# Patient Record
Sex: FEMALE | Race: BLACK OR AFRICAN AMERICAN | Employment: FULL TIME | URBAN - METROPOLITAN AREA
[De-identification: names, ages, dates, MRNs, and addresses within clinical notes are randomized per-mention and may not be internally consistent; named-entity substitution may affect disease eponyms.]

---

## 2020-01-02 ENCOUNTER — TELEPHONE (OUTPATIENT)
Dept: PLASTIC SURGERY | Facility: CLINIC | Age: 30
End: 2020-01-02

## 2020-01-02 DIAGNOSIS — F64.0 GENDER DYSPHORIA IN ADOLESCENT AND ADULT: Primary | ICD-10-CM

## 2020-01-02 NOTE — TELEPHONE ENCOUNTER
[facility]:  Care Coordination Note     SITUATION   Claudia Meade (he/him) is a 29 year old who is receiving support for:  Clinic Care Coordination - Initial (Scheduled consult with Dr. Salamanca)  .    BACKGROUND     Pt is from [place]    Pt wanted to have surgery with [facility].    Writer forgot to ask if pt was a smoker and if they were diabetic.    Pt is on hormones    Pt reported that he does have a LOS and will work on mammogram.    ASSESSMENT     Surgery              [facility] Assessment  [facility] [place] [place]  Patient has a therapist: Yes  Letter of support #1: Requested  Surgery being considered: Yes  Mastectomy: Yes  Mammogram completed: Yes          PLAN            Follow-up plan:      Pt to attend appointment with Dr. Salamanca 4/7/20 with Dr. Salamanca.       Tara Rg

## 2020-01-03 NOTE — TELEPHONE ENCOUNTER
FUTURE VISIT INFORMATION      FUTURE VISIT INFORMATION:    Date: 4/7/20    Time: 1:00pm    [place] [facility] [place]  REFERRAL INFORMATION:    Referring provider:  Self    Referring providers clinic:  N/A    Reason for visit/diagnosis  Top consult    RECORDS REQUESTED FROM:       No recs to collect

## 2020-03-31 ENCOUNTER — PATIENT OUTREACH (OUTPATIENT)
Dept: PLASTIC SURGERY | Facility: CLINIC | Age: 30
End: 2020-03-31

## 2020-03-31 NOTE — PROGRESS NOTES
Called pt, LVM, to discuss change in consultation process & confirm address to send consultation packet.    Pt lives in [place], need to determine if needs to make alternative travel arrangements due to no in-person consults.     Lawrence Hua, [facility]  Transgender Care Coordinator

## 2020-04-01 ENCOUNTER — PATIENT OUTREACH (OUTPATIENT)
Dept: PLASTIC SURGERY | Facility: CLINIC | Age: 30
End: 2020-04-01

## 2020-04-01 NOTE — PROGRESS NOTES
Called pt, LVM, asked pt to confirm if he planned to continue with consult with Dr. Salamanca.    Pt does NOT have insurance on file, if pt continues with consult we need insurance information.     Lawrence Hua, [facility]  Transgender Care Coordinator

## 2020-04-07 ENCOUNTER — PRE VISIT (OUTPATIENT)
Dept: SURGERY | Facility: CLINIC | Age: 30
End: 2020-04-07

## 2020-07-24 ENCOUNTER — TELEPHONE (OUTPATIENT)
Dept: PLASTIC SURGERY | Facility: CLINIC | Age: 30
End: 2020-07-24

## 2020-07-24 NOTE — TELEPHONE ENCOUNTER
Called patient to schedule an in-clinic visit with Dr. Salamanca, patient's appt was cancelled due to Covid-19.     Patient decline rescheduling at this time. Patient will reach back out to us when ready to schedule.    Alhaji Monteiro LPN